# Patient Record
Sex: MALE | Race: WHITE | NOT HISPANIC OR LATINO | Employment: UNEMPLOYED | URBAN - METROPOLITAN AREA
[De-identification: names, ages, dates, MRNs, and addresses within clinical notes are randomized per-mention and may not be internally consistent; named-entity substitution may affect disease eponyms.]

---

## 2020-03-17 ENCOUNTER — HOSPITAL ENCOUNTER (INPATIENT)
Facility: HOSPITAL | Age: 40
LOS: 1 days | Discharge: HOME | DRG: 443 | End: 2020-03-18
Attending: EMERGENCY MEDICINE | Admitting: HOSPITALIST

## 2020-03-17 DIAGNOSIS — R17 JAUNDICE: ICD-10-CM

## 2020-03-17 DIAGNOSIS — B15.9 VIRAL HEPATITIS A WITHOUT HEPATIC COMA: Primary | ICD-10-CM

## 2020-03-17 PROBLEM — J45.909 UNCOMPLICATED ASTHMA: Status: ACTIVE | Noted: 2020-03-17

## 2020-03-17 PROBLEM — R10.84 GENERALIZED ABDOMINAL PAIN: Status: ACTIVE | Noted: 2020-03-17

## 2020-03-17 PROBLEM — R10.32 LEFT LOWER QUADRANT ABDOMINAL PAIN: Status: ACTIVE | Noted: 2020-03-17

## 2020-03-17 PROBLEM — F17.200 TOBACCO DEPENDENCY: Status: ACTIVE | Noted: 2020-03-17

## 2020-03-17 PROBLEM — R79.89 ELEVATED LFTS: Status: ACTIVE | Noted: 2020-03-17

## 2020-03-17 LAB
ALBUMIN SERPL BCP-MCNC: 2.9 G/DL (ref 3.5–5.2)
ALBUMIN SERPL BCP-MCNC: 3.2 G/DL (ref 3.5–5.2)
ALP SERPL-CCNC: 246 U/L (ref 55–135)
ALP SERPL-CCNC: 302 U/L (ref 55–135)
ALT SERPL W/O P-5'-P-CCNC: 565 U/L (ref 10–44)
ALT SERPL W/O P-5'-P-CCNC: 689 U/L (ref 10–44)
AMPHET+METHAMPHET UR QL: NORMAL
ANION GAP SERPL CALC-SCNC: 8 MMOL/L (ref 8–16)
ANION GAP SERPL CALC-SCNC: 9 MMOL/L (ref 8–16)
APAP SERPL-MCNC: <3 UG/ML (ref 10–20)
AST SERPL-CCNC: 146 U/L (ref 10–40)
AST SERPL-CCNC: 196 U/L (ref 10–40)
BACTERIA #/AREA URNS HPF: ABNORMAL /HPF
BARBITURATES UR QL SCN>200 NG/ML: NEGATIVE
BASOPHILS # BLD AUTO: 0.07 K/UL (ref 0–0.2)
BASOPHILS NFR BLD: 0.9 % (ref 0–1.9)
BENZODIAZ UR QL SCN>200 NG/ML: NEGATIVE
BILIRUB SERPL-MCNC: 5.6 MG/DL (ref 0.1–1)
BILIRUB SERPL-MCNC: 6.3 MG/DL (ref 0.1–1)
BILIRUB UR QL STRIP: ABNORMAL
BUN SERPL-MCNC: 11 MG/DL (ref 6–20)
BUN SERPL-MCNC: 6 MG/DL (ref 6–20)
BZE UR QL SCN: NEGATIVE
CALCIUM SERPL-MCNC: 8.9 MG/DL (ref 8.7–10.5)
CALCIUM SERPL-MCNC: 9.3 MG/DL (ref 8.7–10.5)
CANNABINOIDS UR QL SCN: NORMAL
CHLORIDE SERPL-SCNC: 103 MMOL/L (ref 95–110)
CHLORIDE SERPL-SCNC: 105 MMOL/L (ref 95–110)
CLARITY UR: CLEAR
CO2 SERPL-SCNC: 23 MMOL/L (ref 23–29)
CO2 SERPL-SCNC: 27 MMOL/L (ref 23–29)
COLOR UR: YELLOW
CREAT SERPL-MCNC: 0.8 MG/DL (ref 0.5–1.4)
CREAT SERPL-MCNC: 0.9 MG/DL (ref 0.5–1.4)
CREAT UR-MCNC: 197.4 MG/DL (ref 23–375)
DIFFERENTIAL METHOD: ABNORMAL
EOSINOPHIL # BLD AUTO: 0.3 K/UL (ref 0–0.5)
EOSINOPHIL NFR BLD: 4.3 % (ref 0–8)
ERYTHROCYTE [DISTWIDTH] IN BLOOD BY AUTOMATED COUNT: 14.4 % (ref 11.5–14.5)
EST. GFR  (AFRICAN AMERICAN): >60 ML/MIN/1.73 M^2
EST. GFR  (AFRICAN AMERICAN): >60 ML/MIN/1.73 M^2
EST. GFR  (NON AFRICAN AMERICAN): >60 ML/MIN/1.73 M^2
EST. GFR  (NON AFRICAN AMERICAN): >60 ML/MIN/1.73 M^2
ETHANOL SERPL-MCNC: 11 MG/DL
GLUCOSE SERPL-MCNC: 82 MG/DL (ref 70–110)
GLUCOSE SERPL-MCNC: 88 MG/DL (ref 70–110)
GLUCOSE UR QL STRIP: NEGATIVE
HAV IGM SERPL QL IA: POSITIVE
HBV CORE IGM SERPL QL IA: NEGATIVE
HBV SURFACE AG SERPL QL IA: NEGATIVE
HCT VFR BLD AUTO: 42.8 % (ref 40–54)
HCV AB SERPL QL IA: NEGATIVE
HGB BLD-MCNC: 14 G/DL (ref 14–18)
HGB UR QL STRIP: NEGATIVE
IMM GRANULOCYTES # BLD AUTO: 0.02 K/UL (ref 0–0.04)
IMM GRANULOCYTES NFR BLD AUTO: 0.3 % (ref 0–0.5)
INR PPP: 1.2 (ref 0.8–1.2)
INR PPP: 1.2 (ref 0.8–1.2)
KETONES UR QL STRIP: NEGATIVE
LEUKOCYTE ESTERASE UR QL STRIP: NEGATIVE
LIPASE SERPL-CCNC: 58 U/L (ref 4–60)
LYMPHOCYTES # BLD AUTO: 2.6 K/UL (ref 1–4.8)
LYMPHOCYTES NFR BLD: 33.9 % (ref 18–48)
MCH RBC QN AUTO: 30.2 PG (ref 27–31)
MCHC RBC AUTO-ENTMCNC: 32.7 G/DL (ref 32–36)
MCV RBC AUTO: 92 FL (ref 82–98)
METHADONE UR QL SCN>300 NG/ML: NEGATIVE
MICROSCOPIC COMMENT: ABNORMAL
MONOCYTES # BLD AUTO: 1.5 K/UL (ref 0.3–1)
MONOCYTES NFR BLD: 20 % (ref 4–15)
NEUTROPHILS # BLD AUTO: 3.1 K/UL (ref 1.8–7.7)
NEUTROPHILS NFR BLD: 40.6 % (ref 38–73)
NITRITE UR QL STRIP: POSITIVE
NRBC BLD-RTO: 0 /100 WBC
OPIATES UR QL SCN: NEGATIVE
PCP UR QL SCN>25 NG/ML: NEGATIVE
PH UR STRIP: 6 [PH] (ref 5–8)
PLATELET # BLD AUTO: 233 K/UL (ref 150–350)
PMV BLD AUTO: 11.8 FL (ref 9.2–12.9)
POTASSIUM SERPL-SCNC: 3.9 MMOL/L (ref 3.5–5.1)
POTASSIUM SERPL-SCNC: 4.2 MMOL/L (ref 3.5–5.1)
PROT SERPL-MCNC: 6.8 G/DL (ref 6–8.4)
PROT SERPL-MCNC: 7.4 G/DL (ref 6–8.4)
PROT UR QL STRIP: NEGATIVE
PROTHROMBIN TIME: 12.4 SEC (ref 9–12.5)
PROTHROMBIN TIME: 12.5 SEC (ref 9–12.5)
RBC # BLD AUTO: 4.63 M/UL (ref 4.6–6.2)
RBC #/AREA URNS HPF: 2 /HPF (ref 0–4)
SODIUM SERPL-SCNC: 136 MMOL/L (ref 136–145)
SODIUM SERPL-SCNC: 139 MMOL/L (ref 136–145)
SP GR UR STRIP: 1.02 (ref 1–1.03)
SQUAMOUS #/AREA URNS HPF: 2 /HPF
TOXICOLOGY INFORMATION: NORMAL
URN SPEC COLLECT METH UR: ABNORMAL
UROBILINOGEN UR STRIP-ACNC: >=8 EU/DL
WBC # BLD AUTO: 7.65 K/UL (ref 3.9–12.7)
WBC #/AREA URNS HPF: 1 /HPF (ref 0–5)

## 2020-03-17 PROCEDURE — 36415 COLL VENOUS BLD VENIPUNCTURE: CPT

## 2020-03-17 PROCEDURE — 94640 AIRWAY INHALATION TREATMENT: CPT

## 2020-03-17 PROCEDURE — 80053 COMPREHEN METABOLIC PANEL: CPT | Mod: 91

## 2020-03-17 PROCEDURE — 99255 PR INITIAL INPATIENT CONSULT,LEVL V: ICD-10-PCS | Mod: ,,, | Performed by: INTERNAL MEDICINE

## 2020-03-17 PROCEDURE — 99255 IP/OBS CONSLTJ NEW/EST HI 80: CPT | Mod: ,,, | Performed by: INTERNAL MEDICINE

## 2020-03-17 PROCEDURE — 80053 COMPREHEN METABOLIC PANEL: CPT

## 2020-03-17 PROCEDURE — 80329 ANALGESICS NON-OPIOID 1 OR 2: CPT

## 2020-03-17 PROCEDURE — 99900035 HC TECH TIME PER 15 MIN (STAT)

## 2020-03-17 PROCEDURE — 12000002 HC ACUTE/MED SURGE SEMI-PRIVATE ROOM

## 2020-03-17 PROCEDURE — 25000003 PHARM REV CODE 250: Performed by: NURSE PRACTITIONER

## 2020-03-17 PROCEDURE — 80307 DRUG TEST PRSMV CHEM ANLYZR: CPT

## 2020-03-17 PROCEDURE — 85610 PROTHROMBIN TIME: CPT

## 2020-03-17 PROCEDURE — 25000242 PHARM REV CODE 250 ALT 637 W/ HCPCS: Performed by: NURSE PRACTITIONER

## 2020-03-17 PROCEDURE — 99285 EMERGENCY DEPT VISIT HI MDM: CPT | Mod: 25

## 2020-03-17 PROCEDURE — 81000 URINALYSIS NONAUTO W/SCOPE: CPT | Mod: 59

## 2020-03-17 PROCEDURE — 80320 DRUG SCREEN QUANTALCOHOLS: CPT

## 2020-03-17 PROCEDURE — 80074 ACUTE HEPATITIS PANEL: CPT

## 2020-03-17 PROCEDURE — 63600175 PHARM REV CODE 636 W HCPCS: Performed by: EMERGENCY MEDICINE

## 2020-03-17 PROCEDURE — 85025 COMPLETE CBC W/AUTO DIFF WBC: CPT

## 2020-03-17 PROCEDURE — 85610 PROTHROMBIN TIME: CPT | Mod: 91

## 2020-03-17 PROCEDURE — S4991 NICOTINE PATCH NONLEGEND: HCPCS | Performed by: NURSE PRACTITIONER

## 2020-03-17 PROCEDURE — 83690 ASSAY OF LIPASE: CPT

## 2020-03-17 RX ORDER — AMOXICILLIN 250 MG
1 CAPSULE ORAL 2 TIMES DAILY
Status: DISCONTINUED | OUTPATIENT
Start: 2020-03-17 | End: 2020-03-18 | Stop reason: HOSPADM

## 2020-03-17 RX ORDER — SODIUM CHLORIDE 0.9 % (FLUSH) 0.9 %
10 SYRINGE (ML) INJECTION
Status: DISCONTINUED | OUTPATIENT
Start: 2020-03-17 | End: 2020-03-18 | Stop reason: HOSPADM

## 2020-03-17 RX ORDER — LANOLIN ALCOHOL/MO/W.PET/CERES
800 CREAM (GRAM) TOPICAL
Status: DISCONTINUED | OUTPATIENT
Start: 2020-03-17 | End: 2020-03-18 | Stop reason: HOSPADM

## 2020-03-17 RX ORDER — IBUPROFEN 200 MG
24 TABLET ORAL
Status: DISCONTINUED | OUTPATIENT
Start: 2020-03-17 | End: 2020-03-18 | Stop reason: HOSPADM

## 2020-03-17 RX ORDER — IPRATROPIUM BROMIDE AND ALBUTEROL SULFATE 2.5; .5 MG/3ML; MG/3ML
3 SOLUTION RESPIRATORY (INHALATION) EVERY 6 HOURS PRN
Status: DISCONTINUED | OUTPATIENT
Start: 2020-03-17 | End: 2020-03-18 | Stop reason: HOSPADM

## 2020-03-17 RX ORDER — IBUPROFEN 200 MG
16 TABLET ORAL
Status: DISCONTINUED | OUTPATIENT
Start: 2020-03-17 | End: 2020-03-18 | Stop reason: HOSPADM

## 2020-03-17 RX ORDER — GLUCAGON 1 MG
1 KIT INJECTION
Status: DISCONTINUED | OUTPATIENT
Start: 2020-03-17 | End: 2020-03-18 | Stop reason: HOSPADM

## 2020-03-17 RX ORDER — POTASSIUM CHLORIDE 20 MEQ/15ML
40 SOLUTION ORAL
Status: DISCONTINUED | OUTPATIENT
Start: 2020-03-17 | End: 2020-03-18 | Stop reason: HOSPADM

## 2020-03-17 RX ORDER — IBUPROFEN 200 MG
1 TABLET ORAL DAILY
Status: DISCONTINUED | OUTPATIENT
Start: 2020-03-17 | End: 2020-03-18

## 2020-03-17 RX ORDER — ONDANSETRON 2 MG/ML
4 INJECTION INTRAMUSCULAR; INTRAVENOUS EVERY 6 HOURS PRN
Status: DISCONTINUED | OUTPATIENT
Start: 2020-03-17 | End: 2020-03-18 | Stop reason: HOSPADM

## 2020-03-17 RX ORDER — KETOROLAC TROMETHAMINE 30 MG/ML
15 INJECTION, SOLUTION INTRAMUSCULAR; INTRAVENOUS EVERY 6 HOURS PRN
Status: DISCONTINUED | OUTPATIENT
Start: 2020-03-17 | End: 2020-03-18 | Stop reason: HOSPADM

## 2020-03-17 RX ADMIN — ACETYLCYSTEINE 4000 MG: 200 INJECTION INTRAVENOUS at 06:03

## 2020-03-17 RX ADMIN — NICOTINE 1 PATCH: 14 PATCH, EXTENDED RELEASE TRANSDERMAL at 08:03

## 2020-03-17 RX ADMIN — ACETYLCYSTEINE 8000 MG: 200 INJECTION, SOLUTION INTRAVENOUS at 10:03

## 2020-03-17 RX ADMIN — IPRATROPIUM BROMIDE AND ALBUTEROL SULFATE 3 ML: .5; 2.5 SOLUTION RESPIRATORY (INHALATION) at 11:03

## 2020-03-17 RX ADMIN — ACETYLCYSTEINE 12000 MG: 200 INJECTION INTRAVENOUS at 05:03

## 2020-03-17 NOTE — ED PROVIDER NOTES
"Encounter Date: 3/17/2020       History     Chief Complaint   Patient presents with    Abdominal Pain     with bloating    Jaundice     eye yellow and urine brown     HPI patient is a 40-year-old man who presents emergency department complaining of scleral icterus, intermittent diffuse abdominal pain with bloating and associated brown colored urine intermittently for the past 2 weeks.  He states he had fever for the 1st day but has not had fever since.  Endorses loose bowel movements of "all colors".  Denies any hematemesis, denies melena or hematochezia.  Patient endorses taking approximately 40 Lortab, Percocets, etc. per day for multiple years but stopped using 7 months ago.  Patient also states that he does not drink frequently but when he does he drinks over a case of beer a day.  No recent alcohol use.  Review of patient's allergies indicates:  No Known Allergies  Past Medical History:   Diagnosis Date    Asthma     Drug addiction      History reviewed. No pertinent surgical history.  History reviewed. No pertinent family history.  Social History     Tobacco Use    Smoking status: Current Every Day Smoker     Packs/day: 2.00     Types: Cigarettes   Substance Use Topics    Alcohol use: Yes     Comment: occasionally    Drug use: Not Currently     Review of Systems   Constitutional: Positive for activity change and fatigue. Negative for fever.   HENT: Negative for sore throat.    Eyes:        Scleral icterus   Respiratory: Negative for shortness of breath.    Cardiovascular: Negative for chest pain.   Gastrointestinal: Positive for abdominal distention, abdominal pain and nausea.   Genitourinary: Negative for dysuria.   Musculoskeletal: Negative for back pain.   Skin: Negative for rash.   Neurological: Negative for weakness.   Hematological: Does not bruise/bleed easily.       Physical Exam     Initial Vitals [03/17/20 0206]   BP Pulse Resp Temp SpO2   (!) 141/71 103 16 97.9 °F (36.6 °C) 97 %      MAP     "   --         Physical Exam    Constitutional: He appears well-developed and well-nourished.  Non-toxic appearance. No distress.   HENT:   Head: Normocephalic and atraumatic.   Eyes: EOM are normal. Pupils are equal, round, and reactive to light. Scleral icterus is present.   Neck: Normal range of motion. Neck supple. No neck rigidity. No JVD present.   Cardiovascular: Regular rhythm, normal heart sounds and intact distal pulses. Tachycardia present.  Exam reveals no gallop and no friction rub.    No murmur heard.  Pulmonary/Chest: Breath sounds normal. He has no wheezes. He has no rhonchi. He has no rales.   Abdominal: Soft. Bowel sounds are normal. He exhibits distension. He exhibits no fluid wave. There is generalized tenderness. There is no rebound and no guarding.   Musculoskeletal: Normal range of motion.   Neurological: He is alert and oriented to person, place, and time. He has normal strength and normal reflexes. No cranial nerve deficit or sensory deficit. He exhibits normal muscle tone. Coordination normal. GCS eye subscore is 4. GCS verbal subscore is 5. GCS motor subscore is 6.   Skin: Skin is warm and dry.   Psychiatric: He has a normal mood and affect. His speech is normal and behavior is normal. He is not actively hallucinating.         ED Course   Procedures  Labs Reviewed   CBC W/ AUTO DIFFERENTIAL - Abnormal; Notable for the following components:       Result Value    Mono # 1.5 (*)     Mono% 20.0 (*)     All other components within normal limits   COMPREHENSIVE METABOLIC PANEL - Abnormal; Notable for the following components:    Albumin 3.2 (*)     Total Bilirubin 6.3 (*)     Alkaline Phosphatase 302 (*)      (*)      (*)     All other components within normal limits   URINALYSIS, REFLEX TO URINE CULTURE - Abnormal; Notable for the following components:    Bilirubin (UA) 2+ (*)     Nitrite, UA Positive (*)     Urobilinogen, UA >=8.0 (*)     All other components within normal limits     Narrative:     Preferred Collection Type->Urine, Clean Catch   ACETAMINOPHEN LEVEL - Abnormal; Notable for the following components:    Acetaminophen (Tylenol), Serum <3.0 (*)     All other components within normal limits   ALCOHOL,MEDICAL (ETHANOL) - Abnormal; Notable for the following components:    Alcohol, Medical, Serum 11 (*)     All other components within normal limits   URINALYSIS MICROSCOPIC - Abnormal; Notable for the following components:    Bacteria Few (*)     All other components within normal limits    Narrative:     Preferred Collection Type->Urine, Clean Catch   LIPASE   PROTIME-INR   DRUG SCREEN PANEL, URINE EMERGENCY    Narrative:     Preferred Collection Type->Urine, Clean Catch   HEPATITIS PANEL, ACUTE          Imaging Results          US Abdomen Limited (In process)                  Medical Decision Making:   Initial Assessment:   40-year-old man presents emergency department for evaluation mild diffuse abdominal pain and new onset of jaundice.  Patient's is a significant oral opiate/pain medicine combination with acetaminophen history.  He claims he stopped using several months ago but became ill approximately 2 weeks ago with nausea, abdominal pain and developing scleral icterus.  He has very minimal tenderness on exam without fluid wave.  His mental status is alert orient x3.  He exhibits no asterixis.  Laboratory evaluation reveals transaminitis with ALT of 689, AST of 196, alk-phos 302 and total bilirubin of 6.3.  Right upper quadrant ultrasound shows contracted gallbladder with normal biliary duct and possible enlarged lymph node around the pancreatic head.  I am concerned that the patient may have chronic acetaminophen poisoning and therefore started on N acetylcysteine.  Acute hep panel ordered given the patient has had unprotected sex with multiple partners.  Discussed with Hospital Medicine who agrees to admit the patient for further evaluation with GI consult.                                  Clinical Impression:       ICD-10-CM ICD-9-CM   1. Jaundice R17 782.4             ED Disposition Condition    Admit                           Lee Rubin MD  03/17/20 0605

## 2020-03-17 NOTE — SUBJECTIVE & OBJECTIVE
"Past Medical History:   Diagnosis Date    Asthma     Drug addiction        History reviewed. No pertinent surgical history.    Review of patient's allergies indicates:  No Known Allergies    No current facility-administered medications on file prior to encounter.      Current Outpatient Medications on File Prior to Encounter   Medication Sig    ALBUTEROL INHL Inhale into the lungs.     Family History     Family history is unknown by patient.        Tobacco Use    Smoking status: Current Every Day Smoker     Packs/day: 2.00     Types: Cigarettes   Substance and Sexual Activity    Alcohol use: Yes     Comment: occasionally    Drug use: Not Currently    Sexual activity: Not on file     Review of Systems   Constitutional: Negative for appetite change, chills, fatigue and fever.   HENT: Negative for congestion and sore throat.    Eyes: Negative for visual disturbance.        "my eyes are yellow"     Respiratory: Negative for cough, shortness of breath and wheezing.    Cardiovascular: Negative for chest pain and palpitations.   Gastrointestinal: Positive for abdominal distention, abdominal pain, nausea (resolved) and vomiting (resolved). Negative for diarrhea.   Endocrine: Negative for polydipsia and polyuria.   Genitourinary: Negative for decreased urine volume.        "dark urine"   Musculoskeletal: Positive for back pain (chronic). Negative for arthralgias and myalgias.   Skin: Positive for color change. Negative for rash.   Neurological: Negative for weakness and headaches.   Hematological: Negative for adenopathy. Does not bruise/bleed easily.   Psychiatric/Behavioral: Negative for agitation and confusion.   All other systems reviewed and are negative.    Objective:     Vital Signs (Most Recent):  Temp: 97.8 °F (36.6 °C) (03/17/20 0832)  Pulse: 87 (03/17/20 0832)  Resp: 18 (03/17/20 0832)  BP: 128/81 (03/17/20 0832)  SpO2: 96 % (03/17/20 0832) Vital Signs (24h Range):  Temp:  [97.8 °F (36.6 °C)-97.9 °F (36.6 " °C)] 97.8 °F (36.6 °C)  Pulse:  [] 87  Resp:  [16-18] 18  SpO2:  [96 %-97 %] 96 %  BP: (128-141)/(71-81) 128/81     Weight: 79.8 kg (175 lb 14.8 oz)  Body mass index is 23.86 kg/m².    Physical Exam   Constitutional: He is oriented to person, place, and time. He appears well-developed and well-nourished.   HENT:   Head: Normocephalic and atraumatic.   Eyes: Pupils are equal, round, and reactive to light. EOM are normal. Scleral icterus is present.   Neck: Normal range of motion. Neck supple.   Cardiovascular: Regular rhythm, normal heart sounds and intact distal pulses. Tachycardia present.   Pulmonary/Chest: Effort normal and breath sounds normal. No respiratory distress.   Abdominal: Soft. Bowel sounds are normal. He exhibits distension. There is tenderness (generalized elmer LLQ). There is no rebound and no guarding.   Genitourinary:   Genitourinary Comments: deferred   Musculoskeletal: Normal range of motion. He exhibits no edema.   Neurological: He is alert and oriented to person, place, and time.   Skin: Skin is warm and dry.   jaundiced   Psychiatric: He has a normal mood and affect. His behavior is normal. Judgment and thought content normal.   Nursing note and vitals reviewed.        CRANIAL NERVES     CN III, IV, VI   Pupils are equal, round, and reactive to light.  Extraocular motions are normal.        Significant Labs:   CBC:   Recent Labs   Lab 03/17/20  0218   WBC 7.65   HGB 14.0   HCT 42.8        CMP:   Recent Labs   Lab 03/17/20  0218      K 4.2      CO2 27   GLU 82   BUN 11   CREATININE 0.9   CALCIUM 9.3   PROT 7.4   ALBUMIN 3.2*   BILITOT 6.3*   ALKPHOS 302*   *   *   ANIONGAP 9   EGFRNONAA >60     Urine Studies:   Recent Labs   Lab 03/17/20  0311   COLORU Yellow   APPEARANCEUA Clear   PHUR 6.0   SPECGRAV 1.025   PROTEINUA Negative   GLUCUA Negative   KETONESU Negative   BILIRUBINUA 2+*   OCCULTUA Negative   NITRITE Positive*   UROBILINOGEN >=8.0*    LEUKOCYTESUR Negative   RBCUA 2   WBCUA 1   BACTERIA Few*   SQUAMEPITHEL 2       Significant Imaging: US abdomen:   No acute findings.  Mildly prominent peripancreatic/negrito hepatis lymph node.

## 2020-03-17 NOTE — ASSESSMENT & PLAN NOTE
Pt with history extensive use of acetaminophen containing products in combination with alcohol use raising concern for chronic acetaminophen poisoning-- N acetylcysteine initiated in ED.  Acute hep panel ordered given the patient has had unprotected sex with multiple partners.   No acute findings noted on US abdomen  GI consult  Trend LFT's

## 2020-03-17 NOTE — CARE UPDATE
03/17/20 1131   Patient Assessment/Suction   Level of Consciousness (AVPU) alert   Respiratory Effort Unlabored;Normal   Expansion/Accessory Muscles/Retractions no use of accessory muscles;no retractions;expansion symmetric   Rhythm/Pattern, Respiratory unlabored;pattern regular;depth regular   Cough Frequency no cough   PRE-TX-O2   O2 Device (Oxygen Therapy) room air   SpO2 98 %   Pulse 97   Resp 18   Aerosol Therapy   $ Aerosol Therapy Charges PRN treatment not required   Respiratory Treatment Status (SVN) PRN treatment not required       Aerosol treatments q6PRN with Duoneb.

## 2020-03-17 NOTE — CONSULTS
"[facility]     CC: Abdominal pain    HPI 40 y.o. male with history of asthma and polysubstance abuse presents with several weeks of intermittent, moderate, upper abdominal pain associated with jaundice, intermittent diarrhea and darkening of urine. He admits to prior addiction with oral narcotics (Lortab, Percocet, etc) but states he has not used these in over 7 months, however, his utox is positive for THC and amphetamines. He denies Acetaminophen, supplements, herbs or other OTC medications and drinks "a little bit" of alcohol. He is sexually active with one woman partner (does not use protection). He denies sick contacts. Since admission he feels much improved and asks about discharge home.     Past Medical History:   Diagnosis Date    Asthma     Drug addiction        History reviewed. No pertinent surgical history.    Social History     Tobacco Use    Smoking status: Current Every Day Smoker     Packs/day: 2.00     Types: Cigarettes   Substance Use Topics    Alcohol use: Yes     Comment: occasionally    Drug use: Not Currently       Family History:  -Limited, does not believe there is history of liver disease    Review of Systems  General ROS: negative for - chills, fever or weight loss  Psychological ROS: negative for - hallucination, depression or suicidal ideation  Ophthalmic ROS: negative for - blurry vision, photophobia or eye pain  ENT ROS: negative for - epistaxis, sore throat or rhinorrhea  Respiratory ROS: no cough, shortness of breath, or wheezing  Cardiovascular ROS: no chest pain or dyspnea on exertion  Gastrointestinal ROS: + abdominal pain, no vomiting, intermittent diarrhea (resolved since admission)  Genito-Urinary ROS: no dysuria, trouble voiding, or hematuria; + darkening of urine  Musculoskeletal ROS: negative for - arthralgia, myalgia, weakness  Neurological ROS: no syncope or seizures; no ataxia  Dermatological ROS: negative for pruritis, rash; + jaundice    Physical " Examination  /66   Pulse 97   Temp 96 °F (35.6 °C)   Resp 18   Ht 6' (1.829 m)   Wt 79.8 kg (175 lb 14.8 oz)   SpO2 98%   BMI 23.86 kg/m²   General appearance: alert, cooperative, no distress  HENT: Normocephalic, atraumatic, neck symmetrical, no nasal discharge   Eyes: conjunctivae/corneas clear, PERRL, EOM's intact, sclera icteric  Lungs: clear to auscultation bilaterally, no dullness to percussion bilaterally, symmetric expansion, breathing unlabored  Heart: regular rate and rhythm without rub; no displacement of the PMI   Abdomen: soft, mild ttp in epigastrium/RUQ without rebound or guarding, BS active ,no masses appreciated  Extremities: extremities symmetric; no clubbing, cyanosis, or edema  Integument: Skin color, texture, turgor normal; no rashes; hair distrubution normal, + jaundice  Neurologic: Alert and oriented X 3, no focal sensory or motor neurologic deficits  Psychiatric: no pressured speech; normal affect; no evidence of impaired cognition, no anxiety/depression     Labs:  .  Lab Results   Component Value Date    WBC 7.65 03/17/2020    HGB 14.0 03/17/2020    HCT 42.8 03/17/2020    MCV 92 03/17/2020     03/17/2020       CMP  Sodium   Date Value Ref Range Status   03/17/2020 139 136 - 145 mmol/L Final     Potassium   Date Value Ref Range Status   03/17/2020 4.2 3.5 - 5.1 mmol/L Final     Chloride   Date Value Ref Range Status   03/17/2020 103 95 - 110 mmol/L Final     CO2   Date Value Ref Range Status   03/17/2020 27 23 - 29 mmol/L Final     Glucose   Date Value Ref Range Status   03/17/2020 82 70 - 110 mg/dL Final     BUN, Bld   Date Value Ref Range Status   03/17/2020 11 6 - 20 mg/dL Final     Creatinine   Date Value Ref Range Status   03/17/2020 0.9 0.5 - 1.4 mg/dL Final     Calcium   Date Value Ref Range Status   03/17/2020 9.3 8.7 - 10.5 mg/dL Final     Total Protein   Date Value Ref Range Status   03/17/2020 7.4 6.0 - 8.4 g/dL Final     Albumin   Date Value Ref Range Status    03/17/2020 3.2 (L) 3.5 - 5.2 g/dL Final     Total Bilirubin   Date Value Ref Range Status   03/17/2020 6.3 (H) 0.1 - 1.0 mg/dL Final     Comment:     For infants and newborns, interpretation of results should be based  on gestational age, weight and in agreement with clinical  observations.  Premature Infant recommended reference ranges:  Up to 24 hours.............<8.0 mg/dL  Up to 48 hours............<12.0 mg/dL  3-5 days..................<15.0 mg/dL  6-29 days.................<15.0 mg/dL       Alkaline Phosphatase   Date Value Ref Range Status   03/17/2020 302 (H) 55 - 135 U/L Final     AST   Date Value Ref Range Status   03/17/2020 196 (H) 10 - 40 U/L Final     ALT   Date Value Ref Range Status   03/17/2020 689 (H) 10 - 44 U/L Final     Anion Gap   Date Value Ref Range Status   03/17/2020 9 8 - 16 mmol/L Final     eGFR if    Date Value Ref Range Status   03/17/2020 >60 >60 mL/min/1.73 m^2 Final     eGFR if non    Date Value Ref Range Status   03/17/2020 >60 >60 mL/min/1.73 m^2 Final     Comment:     Calculation used to obtain the estimated glomerular filtration  rate (eGFR) is the CKD-EPI equation.        -INR- 1.2, PT_ 12.4  -Ethanol- 11  -Acetaminophen <3  -Utox- + marijuana, + amphetamine  -Lipase-58  -acute hepatitis panel-       Imaging:  Abdominal ultrasound was independently visualized and reviewed by me and showed no acute findings, mildly prominent peripancreatic/yanna hepatis lymph node    Assessment:   40 y.o. male with polysubstance abuse presents for evaluation of abdominal pain associated with elevated LFTs (ALT >> AST ) and hyperbilirubinemia, suspicious for acute viral hepatitis.    Plan:  -Await viral hepatitis panel  -Repeat CMP with PT/INR this afternoon- possible discharge home later this afternoon vs in AM pending results  -If viral hepatitis panel negative will check MULUGETA, ASMA, AMA, Iron, TIBC, Ceruloplasmin, A1AT  -Should diarrhea recur would check stool  for C.diff and culture  -Avoid hepatotoxic and nephrotoxic agents  -Ok to discontinue Acetylcysteine    -Ok to discontinue HIDA scan  -Ok for regular diet from GI perspective    Radha Encarnacion MD  [facility]  [address], [address]  [place] [address]  Office: [phone]  Fax: [phone]

## 2020-03-17 NOTE — ASSESSMENT & PLAN NOTE
Health hazards associated with cigarette smoking were reviewed with patient and cessation was encouraged. Nicotine replacement and counseling options were discussed.  Spent 3 minutes counseling on cessation, patient not ready to quit.  Will order nicotine patch.

## 2020-03-17 NOTE — HPI
"Yan Herndon III is a 41 y/o male with a past medical history significant for asthma and opiate addiction who presented to the ED with a 2 week history of abdominal pain and jaundiced appearance.  States that his friends encouraged him to come to the ED because his eyes looked "yellow."  Pt states that 2 weeks ago he had one day of N/V, but no fever, no dysuria, and no diarrhea.  He admits that his stool has been a variety of colors, but he has not noted any blood. Patient reports that he used to take up to 40 percocet or lortab daily, but he was able to stop using about 7 months ago. Reports occasional binge drinking, but none recently.  Laboratory evaluation reveals transaminitis with ALT of 689, AST of 196, alk-phos 302 and total bilirubin of 6.3.  Right upper quadrant ultrasound shows contracted gallbladder with normal biliary duct and possible enlarged lymph node around the pancreatic head. He's admitted to the service of hospital medicine for further monitoring and treatment.  "

## 2020-03-17 NOTE — ASSESSMENT & PLAN NOTE
Onset 2 weeks ago.  US abdomen unremarkable.  No leukocytosis.   Consider further imaging.  GI consult  Antiemetics as needed-no c/o N/V upon admission.  Tolerating regular diet

## 2020-03-17 NOTE — H&P
"[facility] Medicine  History & Physical    Patient Name: Yan Herndon III  MRN: [id number]  Admission Date: 3/17/2020  Attending Physician: Romeo Brooke MD   Primary Care Provider: Primary Doctor No         Patient information was obtained from patient and ER records.     Subjective:     Principal Problem:Elevated LFTs    Chief Complaint:   Chief Complaint   Patient presents with    Abdominal Pain     with bloating    Jaundice     eye yellow and urine brown        HPI: Yan Herndon III is a 41 y/o male with a past medical history significant for asthma and opiate addiction who presented to the ED with a 2 week history of abdominal pain and jaundiced appearance.  States that his friends encouraged him to come to the ED because his eyes looked "yellow."  Pt states that 2 weeks ago he had one day of N/V, but no fever, no dysuria, and no diarrhea.  He admits that his stool has been a variety of colors, but he has not noted any blood. Patient reports that he used to take up to 40 percocet or lortab daily, but he was able to stop using about 7 months ago. Reports occasional binge drinking, but none recently.  Laboratory evaluation reveals transaminitis with ALT of 689, AST of 196, alk-phos 302 and total bilirubin of 6.3.  Right upper quadrant ultrasound shows contracted gallbladder with normal biliary duct and possible enlarged lymph node around the pancreatic head. He's admitted to the service of hospital medicine for further monitoring and treatment.    Past Medical History:   Diagnosis Date    Asthma     Drug addiction        History reviewed. No pertinent surgical history.    Review of patient's allergies indicates:  No Known Allergies    No current facility-administered medications on file prior to encounter.      Current Outpatient Medications on File Prior to Encounter   Medication Sig    ALBUTEROL INHL Inhale into the lungs.     Family History     Family " "history is unknown by patient.        Tobacco Use    Smoking status: Current Every Day Smoker     Packs/day: 2.00     Types: Cigarettes   Substance and Sexual Activity    Alcohol use: Yes     Comment: occasionally    Drug use: Not Currently    Sexual activity: Not on file     Review of Systems   Constitutional: Negative for appetite change, chills, fatigue and fever.   HENT: Negative for congestion and sore throat.    Eyes: Negative for visual disturbance.        "my eyes are yellow"     Respiratory: Negative for cough, shortness of breath and wheezing.    Cardiovascular: Negative for chest pain and palpitations.   Gastrointestinal: Positive for abdominal distention, abdominal pain, nausea (resolved) and vomiting (resolved). Negative for diarrhea.   Endocrine: Negative for polydipsia and polyuria.   Genitourinary: Negative for decreased urine volume.        "dark urine"   Musculoskeletal: Positive for back pain (chronic). Negative for arthralgias and myalgias.   Skin: Positive for color change. Negative for rash.   Neurological: Negative for weakness and headaches.   Hematological: Negative for adenopathy. Does not bruise/bleed easily.   Psychiatric/Behavioral: Negative for agitation and confusion.   All other systems reviewed and are negative.    Objective:     Vital Signs (Most Recent):  Temp: 97.8 °F (36.6 °C) (03/17/20 0832)  Pulse: 87 (03/17/20 0832)  Resp: 18 (03/17/20 0832)  BP: 128/81 (03/17/20 0832)  SpO2: 96 % (03/17/20 0832) Vital Signs (24h Range):  Temp:  [97.8 °F (36.6 °C)-97.9 °F (36.6 °C)] 97.8 °F (36.6 °C)  Pulse:  [] 87  Resp:  [16-18] 18  SpO2:  [96 %-97 %] 96 %  BP: (128-141)/(71-81) 128/81     Weight: 79.8 kg (175 lb 14.8 oz)  Body mass index is 23.86 kg/m².    Physical Exam   Constitutional: He is oriented to person, place, and time. He appears well-developed and well-nourished.   HENT:   Head: Normocephalic and atraumatic.   Eyes: Pupils are equal, round, and reactive to light. EOM " are normal. Scleral icterus is present.   Neck: Normal range of motion. Neck supple.   Cardiovascular: Regular rhythm, normal heart sounds and intact distal pulses. Tachycardia present.   Pulmonary/Chest: Effort normal and breath sounds normal. No respiratory distress.   Abdominal: Soft. Bowel sounds are normal. He exhibits distension. There is tenderness (generalized elmer LLQ). There is no rebound and no guarding.   Genitourinary:   Genitourinary Comments: deferred   Musculoskeletal: Normal range of motion. He exhibits no edema.   Neurological: He is alert and oriented to person, place, and time.   Skin: Skin is warm and dry.   jaundiced   Psychiatric: He has a normal mood and affect. His behavior is normal. Judgment and thought content normal.   Nursing note and vitals reviewed.        CRANIAL NERVES     CN III, IV, VI   Pupils are equal, round, and reactive to light.  Extraocular motions are normal.        Significant Labs:   CBC:   Recent Labs   Lab 03/17/20  0218   WBC 7.65   HGB 14.0   HCT 42.8        CMP:   Recent Labs   Lab 03/17/20  0218      K 4.2      CO2 27   GLU 82   BUN 11   CREATININE 0.9   CALCIUM 9.3   PROT 7.4   ALBUMIN 3.2*   BILITOT 6.3*   ALKPHOS 302*   *   *   ANIONGAP 9   EGFRNONAA >60     Urine Studies:   Recent Labs   Lab 03/17/20  0311   COLORU Yellow   APPEARANCEUA Clear   PHUR 6.0   SPECGRAV 1.025   PROTEINUA Negative   GLUCUA Negative   KETONESU Negative   BILIRUBINUA 2+*   OCCULTUA Negative   NITRITE Positive*   UROBILINOGEN >=8.0*   LEUKOCYTESUR Negative   RBCUA 2   WBCUA 1   BACTERIA Few*   SQUAMEPITHEL 2       Significant Imaging: US abdomen:   No acute findings.  Mildly prominent peripancreatic/negrito hepatis lymph node.    Assessment/Plan:     * Elevated LFTs  Pt with history extensive use of acetaminophen containing products in combination with alcohol use raising concern for chronic acetaminophen poisoning-- N acetylcysteine initiated in ED.  Acute  hep panel ordered given the patient has had unprotected sex with multiple partners.   No acute findings noted on US abdomen  GI consult  Trend LFT's      Generalized abdominal pain  Onset 2 weeks ago.  US abdomen unremarkable.  No leukocytosis.   Consider further imaging.  GI consult  Antiemetics as needed-no c/o N/V upon admission.  Tolerating regular diet      Tobacco dependency  Health hazards associated with cigarette smoking were reviewed with patient and cessation was encouraged. Nicotine replacement and counseling options were discussed.  Spent 3 minutes counseling on cessation, patient not ready to quit.  Will order nicotine patch.        Uncomplicated asthma  Chronic; controlled  Supplemental oxygen as needed.  Nebs prn.      VTE Risk Mitigation (From admission, onward)         Ordered     IP VTE LOW RISK PATIENT  Once      03/17/20 0819     Place NITISH hose  Until discontinued      03/17/20 0819     Place sequential compression device  Until discontinued      03/17/20 0819                   SONIA Rivas  Department of Hospital Medicine   [facility]

## 2020-03-17 NOTE — PLAN OF CARE
Plan of care reviewed with patient, patient verbalized understanding. Safety maintained throughout shift.   Pt remained free of fall/trauma.  Vs stable. Patient remains NPO while awaiting to finish NM scan. Sclera yellow. Bed low, call light in reach. Will continue to monitor patient.

## 2020-03-18 VITALS
TEMPERATURE: 97 F | DIASTOLIC BLOOD PRESSURE: 57 MMHG | OXYGEN SATURATION: 97 % | HEIGHT: 72 IN | WEIGHT: 175.94 LBS | BODY MASS INDEX: 23.83 KG/M2 | HEART RATE: 66 BPM | SYSTOLIC BLOOD PRESSURE: 116 MMHG | RESPIRATION RATE: 16 BRPM

## 2020-03-18 PROBLEM — B15.9 HEPATITIS A: Status: ACTIVE | Noted: 2020-03-18

## 2020-03-18 LAB
ALBUMIN SERPL BCP-MCNC: 2.7 G/DL (ref 3.5–5.2)
ALP SERPL-CCNC: 251 U/L (ref 55–135)
ALT SERPL W/O P-5'-P-CCNC: 477 U/L (ref 10–44)
ANION GAP SERPL CALC-SCNC: 8 MMOL/L (ref 8–16)
AST SERPL-CCNC: 133 U/L (ref 10–40)
BASOPHILS # BLD AUTO: 0.05 K/UL (ref 0–0.2)
BASOPHILS NFR BLD: 0.9 % (ref 0–1.9)
BILIRUB SERPL-MCNC: 3.9 MG/DL (ref 0.1–1)
BUN SERPL-MCNC: 9 MG/DL (ref 6–20)
CALCIUM SERPL-MCNC: 8.7 MG/DL (ref 8.7–10.5)
CHLORIDE SERPL-SCNC: 105 MMOL/L (ref 95–110)
CO2 SERPL-SCNC: 26 MMOL/L (ref 23–29)
CREAT SERPL-MCNC: 0.8 MG/DL (ref 0.5–1.4)
DIFFERENTIAL METHOD: ABNORMAL
EOSINOPHIL # BLD AUTO: 0.2 K/UL (ref 0–0.5)
EOSINOPHIL NFR BLD: 3.6 % (ref 0–8)
ERYTHROCYTE [DISTWIDTH] IN BLOOD BY AUTOMATED COUNT: 14.6 % (ref 11.5–14.5)
EST. GFR  (AFRICAN AMERICAN): >60 ML/MIN/1.73 M^2
EST. GFR  (NON AFRICAN AMERICAN): >60 ML/MIN/1.73 M^2
GLUCOSE SERPL-MCNC: 93 MG/DL (ref 70–110)
HCT VFR BLD AUTO: 38.1 % (ref 40–54)
HGB BLD-MCNC: 12.4 G/DL (ref 14–18)
IMM GRANULOCYTES # BLD AUTO: 0.01 K/UL (ref 0–0.04)
IMM GRANULOCYTES NFR BLD AUTO: 0.2 % (ref 0–0.5)
LYMPHOCYTES # BLD AUTO: 2.3 K/UL (ref 1–4.8)
LYMPHOCYTES NFR BLD: 43 % (ref 18–48)
MAGNESIUM SERPL-MCNC: 2.1 MG/DL (ref 1.6–2.6)
MCH RBC QN AUTO: 30.1 PG (ref 27–31)
MCHC RBC AUTO-ENTMCNC: 32.5 G/DL (ref 32–36)
MCV RBC AUTO: 93 FL (ref 82–98)
MONOCYTES # BLD AUTO: 0.8 K/UL (ref 0.3–1)
MONOCYTES NFR BLD: 14.4 % (ref 4–15)
NEUTROPHILS # BLD AUTO: 2 K/UL (ref 1.8–7.7)
NEUTROPHILS NFR BLD: 37.9 % (ref 38–73)
NRBC BLD-RTO: 0 /100 WBC
PHOSPHATE SERPL-MCNC: 3.1 MG/DL (ref 2.7–4.5)
PLATELET # BLD AUTO: 187 K/UL (ref 150–350)
PMV BLD AUTO: 11.7 FL (ref 9.2–12.9)
POTASSIUM SERPL-SCNC: 3.8 MMOL/L (ref 3.5–5.1)
PROT SERPL-MCNC: 6.2 G/DL (ref 6–8.4)
RBC # BLD AUTO: 4.12 M/UL (ref 4.6–6.2)
SODIUM SERPL-SCNC: 139 MMOL/L (ref 136–145)
WBC # BLD AUTO: 5.35 K/UL (ref 3.9–12.7)

## 2020-03-18 PROCEDURE — 80053 COMPREHEN METABOLIC PANEL: CPT

## 2020-03-18 PROCEDURE — 85025 COMPLETE CBC W/AUTO DIFF WBC: CPT

## 2020-03-18 PROCEDURE — 99900035 HC TECH TIME PER 15 MIN (STAT)

## 2020-03-18 PROCEDURE — 25000003 PHARM REV CODE 250: Performed by: NURSE PRACTITIONER

## 2020-03-18 PROCEDURE — 99232 SBSQ HOSP IP/OBS MODERATE 35: CPT | Mod: ,,, | Performed by: INTERNAL MEDICINE

## 2020-03-18 PROCEDURE — S4991 NICOTINE PATCH NONLEGEND: HCPCS | Performed by: NURSE PRACTITIONER

## 2020-03-18 PROCEDURE — 84100 ASSAY OF PHOSPHORUS: CPT

## 2020-03-18 PROCEDURE — 83735 ASSAY OF MAGNESIUM: CPT

## 2020-03-18 PROCEDURE — 36415 COLL VENOUS BLD VENIPUNCTURE: CPT

## 2020-03-18 PROCEDURE — 99232 PR SUBSEQUENT HOSPITAL CARE,LEVL II: ICD-10-PCS | Mod: ,,, | Performed by: INTERNAL MEDICINE

## 2020-03-18 RX ORDER — IBUPROFEN 200 MG
1 TABLET ORAL DAILY
Status: DISCONTINUED | OUTPATIENT
Start: 2020-03-18 | End: 2020-03-18 | Stop reason: HOSPADM

## 2020-03-18 RX ORDER — ALBUTEROL SULFATE 90 UG/1
2 AEROSOL, METERED RESPIRATORY (INHALATION) EVERY 6 HOURS PRN
Qty: 18 G | Refills: 0 | Status: SHIPPED | OUTPATIENT
Start: 2020-03-18 | End: 2023-07-27

## 2020-03-18 RX ORDER — ONDANSETRON 4 MG/1
4 TABLET, ORALLY DISINTEGRATING ORAL EVERY 6 HOURS PRN
Qty: 30 TABLET | Refills: 0 | Status: SHIPPED | OUTPATIENT
Start: 2020-03-18 | End: 2023-08-14 | Stop reason: SDUPTHER

## 2020-03-18 RX ORDER — ONDANSETRON 4 MG/1
4 TABLET, ORALLY DISINTEGRATING ORAL EVERY 6 HOURS PRN
Qty: 30 TABLET | Refills: 0 | Status: SHIPPED | OUTPATIENT
Start: 2020-03-18 | End: 2020-03-18 | Stop reason: SDUPTHER

## 2020-03-18 RX ADMIN — NICOTINE 1 PATCH: 14 PATCH, EXTENDED RELEASE TRANSDERMAL at 01:03

## 2020-03-18 NOTE — PLAN OF CARE
03/18/20 1151   Final Note   Assessment Type Final Discharge Note   Anticipated Discharge Disposition Home       Cm spoke with significant other and informed her that pt can call the [phone] to complete the Medicaid application per Renetta, Medicaid Rep.

## 2020-03-18 NOTE — PROGRESS NOTES
"[facility]     CC: Abdominal pain    HPI 40 y.o. male with history of asthma and polysubstance abuse presents with several weeks of intermittent, moderate, upper abdominal pain associated with jaundice, intermittent diarrhea and darkening of urine. He admits to prior addiction with oral narcotics (Lortab, Percocet, etc) but states he has not used these in over 7 months, however, his utox is positive for THC and amphetamines. He denies Acetaminophen, supplements, herbs or other OTC medications and drinks "a little bit" of alcohol. He is sexually active with one woman partner (does not use protection). He denies sick contacts. Since admission he feels much improved and asks about discharge home.     Interval History:  Patient continues to improve and would like to be discharged home. He denies significant abdominal pain or other GI symptoms. We discussed his diagnosis of acute Hepatitis A    Past Medical History:   Diagnosis Date    Asthma     Drug addiction        History reviewed. No pertinent surgical history.    Social History     Tobacco Use    Smoking status: Current Every Day Smoker     Packs/day: 2.00     Types: Cigarettes   Substance Use Topics    Alcohol use: Yes     Comment: occasionally    Drug use: Not Currently       Family History:  -Limited, does not believe there is history of liver disease    Review of Systems  General ROS: negative for - chills, fever or weight loss  Respiratory ROS: no cough, shortness of breath, or wheezing  Cardiovascular ROS: no chest pain or dyspnea on exertion  Gastrointestinal ROS: + abdominal pain, no vomiting, intermittent diarrhea (resolved since admission)  Dermatological ROS: negative for pruritis, rash; + jaundice    Physical Examination  /70   Pulse 70   Temp 97.6 °F (36.4 °C)   Resp 17   Ht 6' (1.829 m)   Wt 79.8 kg (175 lb 14.8 oz)   SpO2 96%   BMI 23.86 kg/m²   General appearance: alert, cooperative, no distress  HENT: Normocephalic, " atraumatic, neck symmetrical, no nasal discharge, + scleral icterus   Lungs: clear to auscultation bilaterally, no dullness to percussion bilaterally, symmetric expansion, breathing unlabored  Heart: regular rate and rhythm without rub; no displacement of the PMI   Abdomen: soft, mild ttp in epigastrium/RUQ without rebound or guarding, BS active ,no masses appreciated  Extremities: extremities symmetric; no clubbing, cyanosis, or edema  Integument: Skin color, texture, turgor normal; no rashes; hair distrubution normal, + jaundice      Labs:  .  Lab Results   Component Value Date    WBC 5.35 03/18/2020    HGB 12.4 (L) 03/18/2020    HCT 38.1 (L) 03/18/2020    MCV 93 03/18/2020     03/18/2020           CMP  Sodium   Date Value Ref Range Status   03/18/2020 139 136 - 145 mmol/L Final     Potassium   Date Value Ref Range Status   03/18/2020 3.8 3.5 - 5.1 mmol/L Final     Chloride   Date Value Ref Range Status   03/18/2020 105 95 - 110 mmol/L Final     CO2   Date Value Ref Range Status   03/18/2020 26 23 - 29 mmol/L Final     Glucose   Date Value Ref Range Status   03/18/2020 93 70 - 110 mg/dL Final     BUN, Bld   Date Value Ref Range Status   03/18/2020 9 6 - 20 mg/dL Final     Creatinine   Date Value Ref Range Status   03/18/2020 0.8 0.5 - 1.4 mg/dL Final     Calcium   Date Value Ref Range Status   03/18/2020 8.7 8.7 - 10.5 mg/dL Final     Total Protein   Date Value Ref Range Status   03/18/2020 6.2 6.0 - 8.4 g/dL Final     Albumin   Date Value Ref Range Status   03/18/2020 2.7 (L) 3.5 - 5.2 g/dL Final     Total Bilirubin   Date Value Ref Range Status   03/18/2020 3.9 (H) 0.1 - 1.0 mg/dL Final     Comment:     For infants and newborns, interpretation of results should be based  on gestational age, weight and in agreement with clinical  observations.  Premature Infant recommended reference ranges:  Up to 24 hours.............<8.0 mg/dL  Up to 48 hours............<12.0 mg/dL  3-5 days..................<15.0  mg/dL  6-29 days.................<15.0 mg/dL       Alkaline Phosphatase   Date Value Ref Range Status   03/18/2020 251 (H) 55 - 135 U/L Final     AST   Date Value Ref Range Status   03/18/2020 133 (H) 10 - 40 U/L Final     ALT   Date Value Ref Range Status   03/18/2020 477 (H) 10 - 44 U/L Final     Anion Gap   Date Value Ref Range Status   03/18/2020 8 8 - 16 mmol/L Final     eGFR if    Date Value Ref Range Status   03/18/2020 >60 >60 mL/min/1.73 m^2 Final     eGFR if non    Date Value Ref Range Status   03/18/2020 >60 >60 mL/min/1.73 m^2 Final     Comment:     Calculation used to obtain the estimated glomerular filtration  rate (eGFR) is the CKD-EPI equation.        -INR- 1.2, PT_ 12.4  -Ethanol- 11  -Acetaminophen <3  -Utox- + marijuana, + amphetamine  -Lipase-58  -acute hepatitis panel- + hepatitis A IgM      Imaging:  Abdominal ultrasound was independently visualized and reviewed by me and showed no acute findings, mildly prominent peripancreatic/yanna hepatis lymph node    Assessment:   40 y.o. male with polysubstance abuse presents for evaluation of abdominal pain associated with elevated LFTs (ALT >> AST ) and hyperbilirubinemia, acute hepatitis A confirmed     Plan:  -Ok to discharge home from GI perspective  -Should follow up with outpatient PCP for repeat CMP and hepatitis B vaccine  -Avoid alcohol and hepatotoxic agents  -Counseled on importance of sanitary practices     Radha Encarnacion MD  [facility]  [address], [address]  [place] [address]  Office: [phone]  Fax: [phone]

## 2020-03-18 NOTE — PLAN OF CARE
03/17/20 1938   Patient Assessment/Suction   Level of Consciousness (AVPU) alert   Respiratory Effort Normal;Unlabored   Expansion/Accessory Muscles/Retractions expansion symmetric   All Lung Fields Breath Sounds diminished;clear   Rhythm/Pattern, Respiratory pattern regular   Cough Frequency infrequent   Cough Type nonproductive   PRE-TX-O2   O2 Device (Oxygen Therapy) room air   SpO2 98 %   Pulse Oximetry Type Intermittent   Aerosol Therapy   $ Aerosol Therapy Charges PRN treatment not required   Respiratory Treatment Status (SVN) PRN treatment not required

## 2020-03-18 NOTE — PLAN OF CARE
Patient alert and oriented resting in bed. NAD. Denies pain or SOB. VSS. Up with assistance. Patient walked around unit without any discomfort. Normal Sr on monitor. Bed alarm active. Plan of care reviewed with patient. Verbalizes understanding.Call light in reach. Pt free from fall or injury. Will monitor.

## 2020-03-18 NOTE — PLAN OF CARE
Cm called significant other Ioana to complete the assessment.  Pt is independent in care and does not have a PCP or insurance.  Cm will set pt up with [facility].  Denies diabetes, dialysis and coumadin.  Disposition: Pt will discharge to home.         03/18/20 1134   Discharge Assessment   Assessment Type Discharge Planning Assessment   Confirmed/corrected address and phone number on facesheet? Yes   Assessment information obtained from? Caregiver  (significant other)   Prior to hospitilization cognitive status: Alert/Oriented   Prior to hospitalization functional status: Independent   Current cognitive status: Alert/Oriented   Current Functional Status: Independent   Facility Arrived From: home   Lives With significant other   Able to Return to Prior Arrangements yes   Is patient able to care for self after discharge? Yes   Who are your caregiver(s) and their phone number(s)? [place] - [phone]   Patient's perception of discharge disposition home or selfcare   Readmission Within the Last 30 Days no previous admission in last 30 days   Patient currently being followed by outpatient case management? No   Patient currently receives any other outside agency services? No   Equipment Currently Used at Home none   Do you have any problems affording any of your prescribed medications? Yes   If yes, what medications? no insurance   Dialysis Name and Scheduled days na   Does the patient receive services at the Coumadin Clinic? No   Discharge Plan A Home with family   DME Needed Upon Discharge  none   Patient/Family in Agreement with Plan yes

## 2020-03-18 NOTE — DISCHARGE INSTRUCTIONS
Thank you for choosing [facility] for your medical care. The primary doctor who is taking care of you at the time of your discharge is Romeo Brooke MD.     You were admitted to the hospital with Hepatitis A.     Please note your discharge instructions, including diet/activity restrictions, follow-up appointments, and medication changes.  If you have any questions about your medical issues, prescriptions, or any other questions, please feel free to contact the [facility] Medicine Dept at [phone] and we will help.    If you are previously with Home health, outpatient PT/OT or under a therapy program, you are cleared to return to those programs.    Please direct all long term medication refills and follow up to your primary care provider, Primary Doctor No. Thank you again for letting us take care of your health care needs.    Please note the following discharge instructions per your discharging physician-  You Should follow up with a Primary care physician for reviewing your repeat blood work and hepatitis B vaccine. Scheduled for outpatient CMP as outpatient  -Avoid alcohol and hepatotoxic agents

## 2020-03-18 NOTE — DISCHARGE SUMMARY
"[facility] Medicine  Discharge Summary      Patient Name: Yan Herndon III  MRN: [id number]  Admission Date: 3/17/2020  Hospital Length of Stay: 1 days  Discharge Date and Time:  03/18/2020 10:53 AM  Attending Physician: Romeo Brooke MD   Discharging Provider: Romeo Brooke MD  Primary Care Provider: Primary Doctor No      HPI:   Yan Herndon III is a 39 y/o male with a past medical history significant for asthma and opiate addiction who presented to the ED with a 2 week history of abdominal pain and jaundiced appearance.  States that his friends encouraged him to come to the ED because his eyes looked "yellow."  Pt states that 2 weeks ago he had one day of N/V, but no fever, no dysuria, and no diarrhea.  He admits that his stool has been a variety of colors, but he has not noted any blood. Patient reports that he used to take up to 40 percocet or lortab daily, but he was able to stop using about 7 months ago. Reports occasional binge drinking, but none recently.  Laboratory evaluation reveals transaminitis with ALT of 689, AST of 196, alk-phos 302 and total bilirubin of 6.3.  Right upper quadrant ultrasound shows contracted gallbladder with normal biliary duct and possible enlarged lymph node around the pancreatic head. He's admitted to the service of hospital medicine for further monitoring and treatment.    * No surgery found *      Hospital Course:   Was admitted for evaluation of abdominal pain associated with elevated LFTs (ALT >> AST ) and hyperbilirubinemia, suspicious for acute viral hepatitis.  GI was consulted.  As per GI viral hepatitis panel was ordered.  The repeat CMP with PT/INR showed improvement over the course of his hospital stay.  Viral hepatitis panel came back positive for hepatitis-A.  Initially patient was placed on acetyl cystine which was Dced on day 2.  HIDA scan was ordered that showed Delayed hepatic clearance, which may " indicate intrinsic hepatic disease. Absent gallbladder activity at 01:00 hour, with probable activity at 04:00 hours.  The findings may indicate chronic cholecystitis.  Patient's LFTs improved and GI cleared the patient for discharge with outpatient follow-up     Consults:   Consults (From admission, onward)        Status Ordering Provider     Case Management/  Once     Provider:  (Not yet assigned)    Acknowledged ETHAN RAMÍREZ     Inpatient consult to Gastroenterology  Once     Provider:  Radha Encarnacion MD    Completed ETHAN RAMÍREZ          No new Assessment & Plan notes have been filed under this hospital service since the last note was generated.  Service: Hospital Medicine    Final Active Diagnoses:    Diagnosis Date Noted POA    PRINCIPAL PROBLEM:  Hepatitis A [B15.9] 03/18/2020 Yes    Elevated LFTs [R94.5] 03/17/2020 Yes    Uncomplicated asthma [J45.909] 03/17/2020 Yes    Generalized abdominal pain [R10.84] 03/17/2020 Yes    Tobacco dependency [F17.200] 03/17/2020 Yes      Problems Resolved During this Admission:       Discharged Condition: stable    Disposition: Home or Self Care    Follow Up:  Follow-up Information     Primary care physician In 1 week.    Why:  After LFTs done               Patient Instructions:      Comprehensive metabolic panel   Standing Status: Future Standing Exp. Date: 05/17/21     Diet Adult Regular     Notify your health care provider if you experience any of the following:  temperature >100.4     Notify your health care provider if you experience any of the following:  severe uncontrolled pain     Activity as tolerated       Significant Diagnostic Studies: Labs:   CMP   Recent Labs   Lab 03/17/20  0218 03/17/20  1504 03/18/20  0522    136 139   K 4.2 3.9 3.8    105 105   CO2 27 23 26   GLU 82 88 93   BUN 11 6 9   CREATININE 0.9 0.8 0.8   CALCIUM 9.3 8.9 8.7   PROT 7.4 6.8 6.2   ALBUMIN 3.2* 2.9* 2.7*   BILITOT 6.3* 5.6* 3.9*    ALKPHOS 302* 246* 251*   * 146* 133*   * 565* 477*   ANIONGAP 9 8 8   ESTGFRAFRICA >60 >60 >60   EGFRNONAA >60 >60 >60   , CBC   Recent Labs   Lab 03/17/20 0218 03/18/20  0522   WBC 7.65 5.35   HGB 14.0 12.4*   HCT 42.8 38.1*    187    and INR   Lab Results   Component Value Date    INR 1.2 03/17/2020    INR 1.2 03/17/2020     Microbiology: Blood Culture No results found for: LABBLOO and Urine Culture  No results found for: LABURIN    Pending Diagnostic Studies:     Procedure Component Value Units Date/Time    Urinalysis, Reflex to Urine Culture Urine, Clean Catch [[id number]] Collected:  03/18/20 0253    Order Status:  Sent Lab Status:  In process Updated:  03/18/20 0254    Specimen:  Urine          Medications:  Reconciled Home Medications:      Medication List      START taking these medications    ondansetron 4 MG Tbdl  Commonly known as:  ZOFRAN-ODT  Take 1 tablet (4 mg total) by mouth every 6 (six) hours as needed.        CONTINUE taking these medications    ALBUTEROL INHL  Inhale into the lungs.            Indwelling Lines/Drains at time of discharge:   Lines/Drains/Airways     None                 Time spent on the discharge of patient: 60 minutes  Patient was seen and examined on the date of discharge and determined to be suitable for discharge.         Romeo Brooke MD  Department of Hospital Medicine  [facility]

## 2020-03-18 NOTE — HOSPITAL COURSE
Was admitted for evaluation of abdominal pain associated with elevated LFTs (ALT >> AST ) and hyperbilirubinemia, suspicious for acute viral hepatitis.  GI was consulted.  As per GI viral hepatitis panel was ordered.  The repeat CMP with PT/INR showed improvement over the course of his hospital stay.  Viral hepatitis panel came back positive for hepatitis-A.  Initially patient was placed on acetyl cystine which was Dced on day 2.  HIDA scan was ordered that showed Delayed hepatic clearance, which may indicate intrinsic hepatic disease. Absent gallbladder activity at 01:00 hour, with probable activity at 04:00 hours.  The findings may indicate chronic cholecystitis.  Patient's LFTs improved and GI cleared the patient for discharge with outpatient follow-up

## 2020-03-20 ENCOUNTER — PATIENT OUTREACH (OUTPATIENT)
Dept: ADMINISTRATIVE | Facility: CLINIC | Age: 40
End: 2020-03-20

## 2020-03-20 NOTE — PATIENT INSTRUCTIONS
C3 nurse attempted to contact patient. The following occurred:   C3 nurse attempted to contact Yan Herndon III  for a TCC post hospital discharge follow up call. The patient is unable to conduct the call @ this time. The patient requested a callback.    The patient does not have a scheduled HOSFU appointment within 7-14 days post hospital discharge date 03/18/2020. [facility]

## 2023-07-24 PROBLEM — K92.2 GI BLEED: Status: ACTIVE | Noted: 2023-07-24

## 2023-07-24 PROBLEM — K57.90 DIVERTICULOSIS: Status: ACTIVE | Noted: 2023-07-24

## 2023-07-24 PROBLEM — K40.90 LEFT INGUINAL HERNIA: Status: ACTIVE | Noted: 2023-07-24

## 2023-07-24 PROBLEM — J18.9 ATYPICAL PNEUMONIA: Status: ACTIVE | Noted: 2023-07-24

## 2023-07-24 PROBLEM — D62 ACUTE BLOOD LOSS ANEMIA: Status: ACTIVE | Noted: 2023-07-24

## 2023-07-25 PROBLEM — D64.9 ANEMIA: Status: ACTIVE | Noted: 2023-07-25

## 2023-07-27 PROBLEM — K29.70 ESOPHAGITIS WITH GASTRITIS: Status: ACTIVE | Noted: 2023-07-27

## 2023-07-27 PROBLEM — K20.90 ESOPHAGITIS WITH GASTRITIS: Status: ACTIVE | Noted: 2023-07-27

## 2023-08-14 PROBLEM — J18.9 ATYPICAL PNEUMONIA: Status: RESOLVED | Noted: 2023-07-24 | Resolved: 2023-08-14

## 2023-08-14 PROBLEM — K92.2 GI BLEED: Status: RESOLVED | Noted: 2023-07-24 | Resolved: 2023-08-14

## 2023-08-14 PROBLEM — J45.909 UNCOMPLICATED ASTHMA: Status: RESOLVED | Noted: 2020-03-17 | Resolved: 2023-08-14

## 2023-08-14 PROBLEM — R10.84 GENERALIZED ABDOMINAL PAIN: Status: RESOLVED | Noted: 2020-03-17 | Resolved: 2023-08-14

## 2023-09-14 PROBLEM — F31.9 BIPOLAR DISORDER: Status: ACTIVE | Noted: 2023-09-14

## 2023-09-14 PROBLEM — K44.9 HIATAL HERNIA: Status: ACTIVE | Noted: 2023-09-14

## 2023-09-14 PROBLEM — R11.2 NAUSEA AND VOMITING: Status: ACTIVE | Noted: 2023-09-14

## 2023-09-18 PROBLEM — R07.2 PRECORDIAL PAIN: Status: ACTIVE | Noted: 2023-09-18

## 2023-12-18 PROBLEM — K92.2 UPPER GI BLEED: Status: RESOLVED | Noted: 2023-07-24 | Resolved: 2023-12-18

## 2024-02-08 NOTE — NURSING
"Discharge instructions given to patient along with rx for zofran and proventil.  Instructed on med regimen and f/u appoint.  He v/u.  Iv and tele removed.  Discharged to home in NAD.  Patient states "walking home"  States lives close and "no big deal".  Declined wheelchair escort out of the hospital.    "
Patient states that he forgot to use the urinal when urinated. Advised patient to use the urinal for measuring of out put. Patient verbalize understanding .  
Additional Notes: Honoring New Year Vicente $50 off any VI Peel For her April Appointment *
Render Risk Assessment In Note?: no
Detail Level: Simple